# Patient Record
Sex: FEMALE | URBAN - METROPOLITAN AREA
[De-identification: names, ages, dates, MRNs, and addresses within clinical notes are randomized per-mention and may not be internally consistent; named-entity substitution may affect disease eponyms.]

---

## 2024-01-31 ENCOUNTER — OFFICE VISIT (OUTPATIENT)
Age: 49
End: 2024-01-31

## 2024-01-31 VITALS
DIASTOLIC BLOOD PRESSURE: 100 MMHG | HEART RATE: 75 BPM | SYSTOLIC BLOOD PRESSURE: 183 MMHG | TEMPERATURE: 98.4 F | OXYGEN SATURATION: 95 % | WEIGHT: 207 LBS

## 2024-01-31 DIAGNOSIS — M54.50 ACUTE BILATERAL LOW BACK PAIN WITHOUT SCIATICA: Primary | ICD-10-CM

## 2024-01-31 DIAGNOSIS — I10 HYPERTENSION, UNSPECIFIED TYPE: ICD-10-CM

## 2024-01-31 RX ORDER — LOSARTAN POTASSIUM AND HYDROCHLOROTHIAZIDE 12.5; 5 MG/1; MG/1
1 TABLET ORAL DAILY
Qty: 30 TABLET | Refills: 1 | Status: SHIPPED | OUTPATIENT
Start: 2024-01-31

## 2024-01-31 RX ORDER — METHOCARBAMOL 500 MG/1
500 TABLET, FILM COATED ORAL 3 TIMES DAILY
Qty: 30 TABLET | Refills: 0 | Status: SHIPPED | OUTPATIENT
Start: 2024-01-31 | End: 2024-02-10

## 2024-01-31 RX ORDER — PREDNISONE 20 MG/1
20 TABLET ORAL
Qty: 15 TABLET | Refills: 0 | Status: SHIPPED | OUTPATIENT
Start: 2024-01-31 | End: 2024-02-05

## 2024-01-31 ASSESSMENT — ENCOUNTER SYMPTOMS
ABDOMINAL PAIN: 0
BOWEL INCONTINENCE: 0
BACK PAIN: 1

## 2024-01-31 NOTE — PROGRESS NOTES
Candice Raphael (:  1975) is a 48 y.o. female,New patient, here for evaluation of the following chief complaint(s):  Back Pain (Lower left back pain since , pulled muscle Friday)      ASSESSMENT/PLAN:  1. Acute bilateral low back pain without sciatica  -return to  or to the ER if worsening symptoms  - methocarbamol (ROBAXIN) 500 MG tablet; Take 1 tablet by mouth 3 times daily for 10 days  Dispense: 30 tablet; Refill: 0  - predniSONE (DELTASONE) 20 MG tablet; Take 1 tablet by mouth 3 times daily (with meals) for 5 days  Dispense: 15 tablet; Refill: 0    2. Hypertension, unspecified type  -advised to f/u with her PCP  - losartan-hydroCHLOROthiazide (HYZAAR) 50-12.5 MG per tablet; Take 1 tablet by mouth daily  Dispense: 30 tablet; Refill: 1       Return if symptoms worsen or fail to improve.    SUBJECTIVE/OBJECTIVE:  Pt c/o 4 day h/o LBP after working in her basement,no radiation of pain,no fall.pt has a h/o HTN,pt stated that she stopped taking her medication because her BP was fine,pt has a PCP      History provided by:  Patient  Back Pain  This is a new problem. The current episode started in the past 7 days. The problem occurs constantly. The problem is unchanged. The pain is present in the lumbar spine. The quality of the pain is described as aching. The pain does not radiate. The pain is moderate. The symptoms are aggravated by twisting and position. Pertinent negatives include no abdominal pain, bladder incontinence, bowel incontinence, chest pain, fever, headaches, leg pain, paresis, paresthesias, pelvic pain, perianal numbness, tingling or weakness.       Vitals:    24 1007   BP: (!) 183/100   Site: Right Upper Arm   Position: Sitting   Cuff Size: Medium Adult   Pulse: 75   Temp: 98.4 °F (36.9 °C)   TempSrc: Oral   SpO2: 95%   Weight: 93.9 kg (207 lb)       Review of Systems   Constitutional:  Negative for activity change, appetite change, fatigue and fever.   Cardiovascular:  Negative

## 2024-03-28 DIAGNOSIS — I10 HYPERTENSION, UNSPECIFIED TYPE: ICD-10-CM

## 2024-03-29 RX ORDER — LOSARTAN POTASSIUM AND HYDROCHLOROTHIAZIDE 12.5; 5 MG/1; MG/1
1 TABLET ORAL DAILY
Qty: 30 TABLET | Refills: 1 | OUTPATIENT
Start: 2024-03-29

## 2025-06-28 ENCOUNTER — OFFICE VISIT (OUTPATIENT)
Age: 50
End: 2025-06-28

## 2025-06-28 VITALS
SYSTOLIC BLOOD PRESSURE: 120 MMHG | OXYGEN SATURATION: 96 % | DIASTOLIC BLOOD PRESSURE: 76 MMHG | HEART RATE: 85 BPM | HEIGHT: 61 IN | RESPIRATION RATE: 18 BRPM | WEIGHT: 215 LBS | BODY MASS INDEX: 40.59 KG/M2 | TEMPERATURE: 98 F

## 2025-06-28 DIAGNOSIS — J02.9 ACUTE PHARYNGITIS, UNSPECIFIED ETIOLOGY: ICD-10-CM

## 2025-06-28 DIAGNOSIS — H66.91 ACUTE RIGHT OTITIS MEDIA: Primary | ICD-10-CM

## 2025-06-28 RX ORDER — PREDNISONE 20 MG/1
20 TABLET ORAL 2 TIMES DAILY
Qty: 10 TABLET | Refills: 0 | Status: SHIPPED | OUTPATIENT
Start: 2025-06-28 | End: 2025-07-03

## 2025-06-28 ASSESSMENT — ENCOUNTER SYMPTOMS
DIARRHEA: 0
RHINORRHEA: 0
VOMITING: 0
SHORTNESS OF BREATH: 0
WHEEZING: 0
COUGH: 0
ABDOMINAL PAIN: 0
NAUSEA: 0
SORE THROAT: 1

## 2025-06-28 NOTE — PROGRESS NOTES
Candice Raphael (:  1975) is a 49 y.o. female,Established patient, here for evaluation of the following chief complaint(s):  Pharyngitis (Also right ear pain. Sxs started Thursday.)      ASSESSMENT/PLAN:  1. Acute right otitis media    - amoxicillin-clavulanate (AUGMENTIN) 875-125 MG per tablet; Take 1 tablet by mouth 2 times daily for 7 days  Dispense: 14 tablet; Refill: 0  - predniSONE (DELTASONE) 20 MG tablet; Take 1 tablet by mouth 2 times daily for 5 days  Dispense: 10 tablet; Refill: 0    2. Acute pharyngitis, unspecified etiology    - amoxicillin-clavulanate (AUGMENTIN) 875-125 MG per tablet; Take 1 tablet by mouth 2 times daily for 7 days  Dispense: 14 tablet; Refill: 0  - predniSONE (DELTASONE) 20 MG tablet; Take 1 tablet by mouth 2 times daily for 5 days  Dispense: 10 tablet; Refill: 0     -salt water gargle,take Tylenol as needed  Return if symptoms worsen or fail to improve.    SUBJECTIVE/OBJECTIVE:    History provided by:  Patient  Pharyngitis  Severity:  Moderate  Onset quality:  Gradual  Duration:  3 days  Timing:  Constant  Progression:  Worsening  Chronicity:  New  Associated symptoms: ear pain (Rt) and sore throat    Associated symptoms: no abdominal pain, no chest pain, no congestion, no cough, no diarrhea, no fatigue, no fever, no headaches, no myalgias, no nausea, no rash, no rhinorrhea, no shortness of breath, no vomiting and no wheezing        Vitals:    25 0946   BP: 120/76   BP Site: Right Upper Arm   Patient Position: Sitting   Pulse: 85   Resp: 18   Temp: 98 °F (36.7 °C)   TempSrc: Oral   SpO2: 96%   Weight: 97.5 kg (215 lb)   Height: 1.549 m (5' 1\")       Review of Systems   Constitutional:  Negative for fatigue and fever.   HENT:  Positive for ear pain (Rt) and sore throat. Negative for congestion and rhinorrhea.    Respiratory:  Negative for cough, shortness of breath and wheezing.    Cardiovascular:  Negative for chest pain.   Gastrointestinal:  Negative for abdominal